# Patient Record
(demographics unavailable — no encounter records)

---

## 2025-02-12 NOTE — HISTORY OF PRESENT ILLNESS
[de-identified] :  RUPA LAWS is a 13 year y/o patient here for initial visit on 02/12/2025. He complains of left ear popping for the past 2 days which started after upper respiratory infection which has since resolved. He also presents with left otalgia which started yesterday which he describes as sharp, can also be dull. Last night pain was a 7/10 in severity, now it is a 2/10. Today he noticed some left otorrhea. He feels his hearing is muffled and congested. He has history of otitis media in the past, never had tubes. He has not tried any medications for this. He denies any nasal or throat symptoms.

## 2025-02-12 NOTE — ASSESSMENT
[FreeTextEntry1] : It is my impression that he is suffering from bilateral eustachian tube dysfunction, tympanic membrane perforation which has now healed, left otitis media likely from recent upper respiratory infection. I am recommending a course of Ceftin for 10 days and Flonase. Follow up in 3 weeks to recheck his hearing. Consider myringotomy if otalgia and ear clogging persist.

## 2025-02-12 NOTE — PHYSICAL EXAM
[TextEntry] : General: The patient was alert and oriented and in no distress. Voice was clear. The patient was alert and oriented and in no distress. Patient accompanied by his father   Face: The patient had no facial asymmetry or mass. The skin was unremarkable.   Eyes: The pupils were equal round and reactive to light and accommodation. There was no significant nystagmus or disconjugate gaze noted.   Ears: The external ears were normal without deformity. The ear canals were clear. Right tympanic membrane retracted, no evidence of fluid  Left tympanic membrane bulging and erythematous    Nose: The external nose had no significant deformity. There was no facial tenderness. On anterior rhinoscopy, the nasal mucosa was clear. The anterior septum was midline. There were no visualized polyps purulence or masses.   Oral cavity: The oral mucosa was normal. The oral and base of tongue were clear and without mass. The gingival and buccal mucosa were moist and without lesions. The palate moved well. There was no cleft to the palate. There appeared to be good salivary flow. There was no pus, erythema or mass in the oral cavity. bifid uvula    Neck: The neck was symmetrical. The parotid and submandibular glands were normal without masses. The trachea was midline and there was no unusual crepitus. The thyroid was smooth and nontender and no masses were palpated. There was no significant cervical adenopathy.   Neuro: Neurologically, the patient was awake, alert, and oriented to person, place and time. There were no obvious focal neurologic abnormalities. Cranial nerves II through XII were grossly intact.   TMJ: The temporomandibular joints were nontender. There was no abnormal crepitus and no significant malocclusion.  The audiogram was ordered by me and interpreted by me today and the results are as follows right borderline normal hearing, left conductive hearing loss, normal word recongition bilaterally, right type C tympanogram, left type B tympanogram.